# Patient Record
Sex: FEMALE | Race: WHITE | NOT HISPANIC OR LATINO | Employment: PART TIME | URBAN - METROPOLITAN AREA
[De-identification: names, ages, dates, MRNs, and addresses within clinical notes are randomized per-mention and may not be internally consistent; named-entity substitution may affect disease eponyms.]

---

## 2021-12-25 ENCOUNTER — HOSPITAL ENCOUNTER (EMERGENCY)
Facility: CLINIC | Age: 31
Discharge: HOME | End: 2021-12-25
Attending: EMERGENCY MEDICINE | Admitting: EMERGENCY MEDICINE
Payer: OTHER GOVERNMENT

## 2021-12-25 VITALS
SYSTOLIC BLOOD PRESSURE: 131 MMHG | RESPIRATION RATE: 16 BRPM | HEART RATE: 96 BPM | OXYGEN SATURATION: 99 % | DIASTOLIC BLOOD PRESSURE: 99 MMHG | TEMPERATURE: 97.4 F | WEIGHT: 120 LBS

## 2021-12-25 DIAGNOSIS — U07.1 INFECTION DUE TO 2019 NOVEL CORONAVIRUS: ICD-10-CM

## 2021-12-25 LAB
FLUAV RNA SPEC QL NAA+PROBE: NEGATIVE
FLUBV RNA RESP QL NAA+PROBE: NEGATIVE
SARS-COV-2 RNA RESP QL NAA+PROBE: POSITIVE

## 2021-12-25 PROCEDURE — C9803 HOPD COVID-19 SPEC COLLECT: HCPCS

## 2021-12-25 PROCEDURE — 87636 SARSCOV2 & INF A&B AMP PRB: CPT | Performed by: EMERGENCY MEDICINE

## 2021-12-25 PROCEDURE — 99283 EMERGENCY DEPT VISIT LOW MDM: CPT

## 2021-12-25 ASSESSMENT — ENCOUNTER SYMPTOMS
VOMITING: 0
CHILLS: 0
DIAPHORESIS: 0
FATIGUE: 1
ABDOMINAL PAIN: 0
FEVER: 0
APPETITE CHANGE: 0
NAUSEA: 0
DIARRHEA: 0

## 2021-12-25 NOTE — ED PROVIDER NOTES
EMERGENCY DEPARTMENT ENCOUNTER      NAME: Kamaljit Ross  AGE: 31 year old female  YOB: 1990  MRN: [id number]  EVALUATION DATE & TIME: 12/25/2021 11:05 AM    PCP: No primary care provider on file.    ED PROVIDER: Kirsten Canada M.D.      Chief Complaint   Patient presents with     Covid 19 Testing         FINAL IMPRESSION:  1. Infection due to 2019 novel coronavirus          ED COURSE & MEDICAL DECISION MAKING:    Pertinent Labs & Imaging studies reviewed. (See chart for details)  31 year old female presents to the Emergency Department for evaluation of congestion and fatigue that started yesterday.  She otherwise denies any constitutional symptoms.  She is well-appearing, nontoxic with stable vital signs.  Covid testing is positive for COVID-19 infection.  I discussed with the patient isolation, observe for worsening symptoms, and return if she does develop significant shortness of breath or otherwise acutely worsening symptoms.    At the conclusion of the encounter I discussed the results of all of the tests and the disposition. The questions were answered. The patient or family acknowledged understanding and was agreeable with the care plan.     11:10 AM I met with patient for initial interview and exam. PPE includes N95, protective glasses, gloves.  12:52 PM I updated the patient with her results and discussed plans for discharge with extensive anticipatory guidance and given return precautions. Patient was agreeable with the plan.         MEDICATIONS GIVEN IN THE EMERGENCY:  Medications - No data to display    NEW PRESCRIPTIONS STARTED AT TODAY'S ER VISIT  New Prescriptions    No medications on file            =================================================================    HPI    Patient information was obtained from: patient     Use of : N/A         Kamaljit Ross is a 31 year old female with no pertinent history, who presents to this ED via walk in for evaluation of  covid-19 testing.    Patient developed sudden onset of congestion and fatigue yesterday. She is unvaccinated against COVID-19 or Influenza. She is a known smoker.     The patient currently denies fever, chills, sweats, headache, cough, chest pain, abdominal pain, nausea, vomiting, diarrhea, appetite change, or any other associated symptoms. No other medical complaints at this time.        REVIEW OF SYSTEMS   Review of Systems   Constitutional: Positive for fatigue. Negative for appetite change, chills, diaphoresis and fever.   HENT: Positive for congestion.    Cardiovascular: Negative for chest pain.   Gastrointestinal: Negative for abdominal pain, diarrhea, nausea and vomiting.   All other systems reviewed and are negative.       PAST MEDICAL HISTORY:  History reviewed. No pertinent past medical history.    PAST SURGICAL HISTORY:  History reviewed. No pertinent surgical history.        CURRENT MEDICATIONS:    No current facility-administered medications for this encounter.     No current outpatient medications on file.         ALLERGIES:  No Known Allergies    FAMILY HISTORY:  History reviewed. No pertinent family history.    SOCIAL HISTORY:   Social History     Socioeconomic History     Marital status: Not on file     Spouse name: Not on file     Number of children: Not on file     Years of education: Not on file     Highest education level: Not on file   Occupational History     Not on file   Tobacco Use     Smoking status: Not on file     Smokeless tobacco: Not on file   Substance and Sexual Activity     Alcohol use: Not on file     Drug use: Not on file     Sexual activity: Not on file   Other Topics Concern     Not on file   Social History Narrative     Not on file     Social Determinants of Health     Financial Resource Strain: Not on file   Food Insecurity: Not on file   Transportation Needs: Not on file   Physical Activity: Not on file   Stress: Not on file   Social Connections: Not on file   Intimate  Partner Violence: Not on file   Housing Stability: Not on file       VITALS:  BP (!) 131/99   Pulse 105   Temp 97.4  F (36.3  C) (Temporal)   Resp 12   Wt 54.4 kg (120 lb)   LMP 11/24/2021   SpO2 100%     PHYSICAL EXAM    Constitutional: Well developed, Well nourished, NAD  HENT: Normocephalic, Atraumatic, Bilateral external ears normal, Oropharynx normal, mucous membranes moist, Nose normal. Neck- Normal range of motion, No tenderness, Supple, No stridor.   Eyes: PERRL, EOMI, Conjunctiva normal, No discharge.   Respiratory: Normal breath sounds, No respiratory distress, No wheezing, Speaks full sentences easily. No cough.    Cardiovascular: Normal heart rate, Regular rhythm, Chest wall nontender.    GI: Bowel sounds normal, Soft, No tenderness  Musculoskeletal: 2+ DP pulses. No edema.  No cyanosis, No clubbing. Good range of motion in all major joints. No tenderness to palpation or major deformities noted.   Integument: Warm, Dry, No erythema, No rash. No petechiae.   Neurologic: Alert & oriented x 3, Normal motor function, Normal sensory function, No focal deficits noted.    Psychiatric: Affect normal, Judgment normal, Mood normal. Cooperative.      LAB/RADIOLOGY:  All pertinent labs and imaging reviewed and interpreted. Please see official radiology report.  Results for orders placed or performed during the hospital encounter of 12/25/21   Symptomatic; Unknown Influenza A/B & SARS-CoV2 (COVID-19) Virus PCR Multiplex Nasopharyngeal    Specimen: Nasopharyngeal; Swab   Result Value Ref Range    Influenza A PCR Negative Negative    Influenza B PCR Negative Negative    SARS CoV2 PCR Positive (A) Negative         PROCEDURES:   None      I, Diane Munroe, am serving as a scribe to document services personally performed by Dr. Canada based on my observation and the provider's statements to me. I, Kirsten Canada MD attest that Diane Munroe, is acting in a scribe capacity, has observed my performance of the  services and has documented them in accordance with my direction.    Kirsten Canada M.D.  Emergency Medicine  [facility] EMERGENCY ROOM  [address] [id number] [phone]  Dept: [phone]     Kirsten Canada MD  12/25/21 6423

## 2021-12-26 ENCOUNTER — NURSE TRIAGE (OUTPATIENT)
Dept: NURSING | Facility: CLINIC | Age: 31
End: 2021-12-26

## 2021-12-26 NOTE — TELEPHONE ENCOUNTER
She is visiting from [place]. Tested positive for coronavirus. They said it was faint content. Has to travel to [place] by the first of January. She was congested and has allergies. Can we do a home test and change her ticket to travel before the first of January? I advised him to contact the airlines to find out what testing is required and restrictions but that she should wait as much as possible since she tested positive for coronavirus.  Roselyn Robles RN  [facility]    Reason for Disposition    Health Information question, no triage required and triager able to answer question    Additional Information    Negative: [1] Caller is not with the adult (patient) AND [2] reporting urgent symptoms    Negative: Lab result questions    Negative: Medication questions    Negative: Caller can't be reached by phone    Negative: Caller has already spoken to PCP or another triager    Negative: RN needs further essential information from caller in order to complete triage    Negative: Requesting regular office appointment    Negative: [1] Caller requesting NON-URGENT health information AND [2] PCP's office is the best resource    Protocols used: INFORMATION ONLY CALL - NO TRIAGE-A-

## 2023-11-20 ENCOUNTER — HOSPITAL ENCOUNTER (EMERGENCY)
Facility: CLINIC | Age: 33
Discharge: LEFT BEFORE DISCHARGE | End: 2023-11-20
Admitting: EMERGENCY MEDICINE
Payer: MEDICAID

## 2023-11-20 VITALS
DIASTOLIC BLOOD PRESSURE: 79 MMHG | WEIGHT: 120 LBS | TEMPERATURE: 98.3 F | OXYGEN SATURATION: 98 % | HEART RATE: 95 BPM | BODY MASS INDEX: 19.99 KG/M2 | SYSTOLIC BLOOD PRESSURE: 126 MMHG | RESPIRATION RATE: 18 BRPM | HEIGHT: 65 IN

## 2023-11-20 DIAGNOSIS — Z53.21 PATIENT LEFT WITHOUT BEING SEEN: ICD-10-CM

## 2023-11-20 PROCEDURE — 99281 EMR DPT VST MAYX REQ PHY/QHP: CPT

## 2023-11-20 NOTE — ED NOTES
No answer when called in lobby and surrounding areas. Notified that patient didn't answer when provider looking for her previously.

## 2023-11-20 NOTE — ED PROVIDER NOTES
EMERGENCY DEPARTMENT ENCOUNTER      NAME: Kamaljit Ross  AGE: 33 year old female  YOB: 1990  MRN: [id number]  EVALUATION DATE & TIME: No admission date for patient encounter.    PCP: System, Provider Not In    ED PROVIDER: Helen Woodward PA-C      Chief Complaint   Patient presents with    Leg Swelling         FINAL IMPRESSION:  1. Patient left without being seen          ED COURSE & MEDICAL DECISION MAKIN:10 PM Attempted to meet with patient. Was unable to locate patient in waiting area or surrounding area.   4:30 PM Attempted to meet with patient again. Was unable to locate patient in waiting area or surrounding area.   5:00 PM Attempted to meet with patient again. Was unable to locate patient in waiting area or surrounding area. Patient seems to have left after triage prior to being formally evaluated by provider.    I, Julia Adamson, am serving as a scribe to document services personally performed by Helen Woodward PA-C based on my observation and the provider's statements to me. I, Helen Woodward PA-C, attest that Julia Adamson is acting in a scribe capacity, has observed my performance of the services and has documented them in accordance with my direction.    Helen Woodward PA-C  [facility] EMERGENCY ROOM  [address] [phone] [phone]        Helen Woodward PA-C  [phone]

## 2023-11-20 NOTE — ED TRIAGE NOTES
Patient presents to ED with bilateral foot and calf swelling that she first noticed on Thursday night, denies SOB.  Diana Tavares RN.......11/20/2023 2:04 PM     Triage Assessment (Adult)       Row Name 11/20/23 1406          Triage Assessment    Airway WDL WDL        Respiratory WDL    Respiratory WDL WDL        Skin Circulation/Temperature WDL    Skin Circulation/Temperature WDL WDL        Cardiac WDL    Cardiac WDL WDL        Peripheral/Neurovascular WDL    Peripheral Neurovascular WDL WDL        Cognitive/Neuro/Behavioral WDL    Cognitive/Neuro/Behavioral WDL WDL

## 2024-09-20 ENCOUNTER — LAB REQUISITION (OUTPATIENT)
Dept: LAB | Facility: CLINIC | Age: 34
End: 2024-09-20

## 2024-09-20 DIAGNOSIS — R00.0 TACHYCARDIA, UNSPECIFIED: ICD-10-CM

## 2024-09-20 PROCEDURE — 84443 ASSAY THYROID STIM HORMONE: CPT | Performed by: FAMILY MEDICINE

## 2024-09-20 PROCEDURE — 80053 COMPREHEN METABOLIC PANEL: CPT | Performed by: FAMILY MEDICINE

## 2024-09-21 LAB
ALBUMIN SERPL BCG-MCNC: 3.9 G/DL (ref 3.5–5.2)
ALP SERPL-CCNC: 96 U/L (ref 40–150)
ALT SERPL W P-5'-P-CCNC: 19 U/L (ref 0–50)
ANION GAP SERPL CALCULATED.3IONS-SCNC: 10 MMOL/L (ref 7–15)
AST SERPL W P-5'-P-CCNC: 26 U/L (ref 0–45)
BILIRUB SERPL-MCNC: <0.2 MG/DL
BUN SERPL-MCNC: 9.1 MG/DL (ref 6–20)
CALCIUM SERPL-MCNC: 8.9 MG/DL (ref 8.8–10.4)
CHLORIDE SERPL-SCNC: 103 MMOL/L (ref 98–107)
CREAT SERPL-MCNC: 0.8 MG/DL (ref 0.51–0.95)
EGFRCR SERPLBLD CKD-EPI 2021: >90 ML/MIN/1.73M2
GLUCOSE SERPL-MCNC: 113 MG/DL (ref 70–99)
HCO3 SERPL-SCNC: 24 MMOL/L (ref 22–29)
POTASSIUM SERPL-SCNC: 3.8 MMOL/L (ref 3.4–5.3)
PROT SERPL-MCNC: 6.7 G/DL (ref 6.4–8.3)
SODIUM SERPL-SCNC: 137 MMOL/L (ref 135–145)
TSH SERPL DL<=0.005 MIU/L-ACNC: 1.14 UIU/ML (ref 0.3–4.2)